# Patient Record
Sex: FEMALE | Race: BLACK OR AFRICAN AMERICAN | NOT HISPANIC OR LATINO | Employment: UNEMPLOYED | URBAN - METROPOLITAN AREA
[De-identification: names, ages, dates, MRNs, and addresses within clinical notes are randomized per-mention and may not be internally consistent; named-entity substitution may affect disease eponyms.]

---
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
## 2022-07-25 NOTE — NURSING NOTE
"Chief Complaint   Patient presents with     RECHECK     Follow up from Nexplanon Insertion on 5/9       Initial /67 (BP Location: Right arm, Patient Position: Chair, Cuff Size: Adult Regular)  Pulse 77  Wt 51 kg (112 lb 8 oz)  LMP  Estimated body mass index is 20.49 kg/(m^2) as calculated from the following:    Height as of 5/9/17: 1.575 m (5' 2\").    Weight as of 5/9/17: 50.8 kg (112 lb).  Medication Reconciliation: complete     Shruthi Mcmahon, CMA  May 23, 2017      " Chart Note
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
## 2023-09-20 ENCOUNTER — APPOINTMENT (OUTPATIENT)
Dept: URGENT CARE | Facility: URGENT CARE | Age: 23
End: 2023-09-20

## 2025-06-07 ENCOUNTER — OFFICE VISIT (OUTPATIENT)
Dept: URGENT CARE | Facility: URGENT CARE | Age: 25
End: 2025-06-07

## 2025-06-07 VITALS
BODY MASS INDEX: 23 KG/M2 | TEMPERATURE: 98.1 F | HEART RATE: 98 BPM | HEIGHT: 62 IN | RESPIRATION RATE: 16 BRPM | WEIGHT: 125 LBS | DIASTOLIC BLOOD PRESSURE: 73 MMHG | OXYGEN SATURATION: 98 % | SYSTOLIC BLOOD PRESSURE: 118 MMHG

## 2025-06-07 DIAGNOSIS — Z51.89 ENCOUNTER FOR POST-TRAUMATIC WOUND CHECK: ICD-10-CM

## 2025-06-07 DIAGNOSIS — S81.811D LACERATION OF RIGHT LOWER EXTREMITY, SUBSEQUENT ENCOUNTER: Primary | ICD-10-CM

## 2025-06-07 PROCEDURE — 99204 OFFICE O/P NEW MOD 45 MIN: CPT | Performed by: NURSE PRACTITIONER

## 2025-06-07 ASSESSMENT — PAIN SCALES - GENERAL: PAINLEVEL_OUTOF10: MILD PAIN (2)

## 2025-06-07 NOTE — PROGRESS NOTES
Assessment & Plan   1. Laceration of right lower extremity, subsequent encounter (Primary)      2. Encounter for post-traumatic wound check    See note below related to laceration examination of right lower extremity.  We discussed home care plan as noted through emergency room review/hospital discharge summary.    All areas seem to be healing well no indication for acute bacterial infection do not recommend antibiotic at this time.  Recommend continue cleansing area twice daily with soap and water applying bacitracin nonstick bandage and paper tape other easily removable tape.  Continue to monitor areas closely.  Recommend patient review her discharge summary plan she states she has at home.  If unable to find referral to trauma specialty that was noted on review of hospital encounter note we discussed calling for this referral information so patient can set up an appointment.    We discussed red flag symptoms that would warrant further evaluation.  Patient may continue Tylenol or ibuprofen as needed for discomfort.  Discussed recommendations for ambulation would recommend patient start to ambulate at home without use of walker if able to help increase circulation and decrease swelling of right lower extremity discussed ankle pumps for right lower extremity to help with circulation as well has elevation to heart level or higher throughout the day.  Letter was given for work.      45 minutes spent on the date of the encounter doing chart review, review of outside records, review of test results, patient visit, and documentation       KAIT Hyde [facility] URGENT CARE GUERRERONILSON Bates is a 24 year old female who presents to clinic today for the following health issues:  Chief Complaint   Patient presents with    Wound Check     Stitches placed on 6/2 at [facility] ED; patient has 28 stitches throughout right leg from falling through glass. Concerns for infection due  to increase in swelling and numbness.     Patient Request for Note/Letter     Patient requesting note for work as she was not told how long she needs to be off of work for due to injury.          6/7/2025     3:17 PM   Additional Questions   Roomed by MEHUL Kapoor   Accompanied by Sivakumar mother         6/7/2025   Forms   Any forms needing to be completed Yes         6/7/2025     3:17 PM   Patient Reported Additional Medications   Patient reports taking the following new medications Oxycodone PRN, ibuprofen, Robaxin, birth control, Ventolin     HPI    Patient presents to clinic she was recently discharged from [facility] in [facility] after traumatic injury to right lower extremity review of emergency encounter note through care everywhere indicates that patient stated she kicked a glass window with her right leg patient was noted to have BAL of 2.4 and was admitted for observation.  She was discharged with multiple sutures from lacerations right lower extremity with follow-up plan of care to include washing area twice daily with soap and water applying dressing for 7 days then could leave area open to air and follow-up with trauma specialty for suture removal and evaluation in 2 weeks.    Patient is with her mother who has been helping to care for patient's wounds.  Mom states that they have been following instructions as noted above are concerned of some areas of erythema.    Patient is also complaining of some numbness in the medial right ankle with right ankle swelling does improve with elevation.    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  States she has not been able to fully bear weight without use of a walker.  Review of ER notes states return to full activity without restrictions.      Review of Systems  Constitutional, HEENT, cardiovascular, pulmonary, gi and gu systems are negative, except as otherwise  "noted.      Objective    /73 (BP Location: Left arm, Patient Position: Sitting, Cuff Size: Adult Regular)   Pulse 98   Temp 98.1  F (36.7  C) (Oral)   Resp 16   Ht 1.575 m (5' 2\")   Wt 56.7 kg (125 lb)   SpO2 98%   BMI 22.86 kg/m    Physical Exam   GENERAL: alert and no distress  NECK: no adenopathy, no asymmetry, masses, or scars  RESP: lungs clear to auscultation - no rales, rhonchi or wheezes  CV: regular rate and rhythm, normal S1 S2, no S3 or S4, no murmur, click or rub, no peripheral edema  MS: Right lower extremity ankle has full range of motion CMS is intact with normal pulses tenderness areas of injury.  Swelling diffusely around ankle and dorsal foot.  SKIN: Multiple lacerations right lower extremity dressings were removed for evaluation mid medial upper thigh and posterior upper thigh sutures intact mild erythema negative for fluctuance warmth or drainage, right lower extremity multiple lacerations medial calf and ankle sutures intact mild erythema negative for fluctuance warmth or drainage similar area right lower lateral mid calf region.  All areas dry intact negative for fluctuance warmth or drainage.  Bacitracin was reapplied with appropriate dressings patient monitored evaluation well.          "

## 2025-06-07 NOTE — PROGRESS NOTES
Urgent Care Clinic Visit    Chief Complaint   Patient presents with    Wound Check     Stitches placed on 6/2 at [facility] ED; patient has 28 stitches throughout right leg from falling through glass. Concerns for infection due to increase in swelling and numbness.     Patient Request for Note/Letter     Patient requesting note for work as she was not told how long she needs to be off of work for due to injury.                6/7/2025     3:17 PM   Additional Questions   Roomed by MEHUL Kapoor   Accompanied by mother Shaver         6/7/2025   Forms   Any forms needing to be completed Yes         6/7/2025     3:17 PM   Patient Reported Additional Medications   Patient reports taking the following new medications Oxycodone PRN, ibuprofen, Robaxin, birth control, Ventolin     Antwon Fitzgerald LPN

## 2025-06-07 NOTE — Clinical Note
June 7, 2025      Jana Penaloza  [address] [address]        To Whom It May Concern:    Cinthiapetr BILLY Tremaine  was seen on ***.  Please excuse her  until *** due to {WORK EXCUSE:839433}.        Sincerely,        KAIT Hyde CNP    Electronically signed

## 2025-06-26 ENCOUNTER — OFFICE VISIT (OUTPATIENT)
Dept: URGENT CARE | Facility: URGENT CARE | Age: 25
End: 2025-06-26

## 2025-06-26 VITALS
OXYGEN SATURATION: 99 % | HEART RATE: 85 BPM | SYSTOLIC BLOOD PRESSURE: 108 MMHG | WEIGHT: 130 LBS | DIASTOLIC BLOOD PRESSURE: 75 MMHG | BODY MASS INDEX: 23.78 KG/M2 | RESPIRATION RATE: 18 BRPM | TEMPERATURE: 98.7 F

## 2025-06-26 DIAGNOSIS — Z51.89 ENCOUNTER FOR WOUND RE-CHECK: ICD-10-CM

## 2025-06-26 DIAGNOSIS — Z48.02 VISIT FOR SUTURE REMOVAL: ICD-10-CM

## 2025-06-26 DIAGNOSIS — S99.911A ANKLE INJURY, RIGHT, INITIAL ENCOUNTER: Primary | ICD-10-CM

## 2025-06-26 NOTE — PROGRESS NOTES
Urgent Care Clinic Visit    Chief Complaint   Patient presents with    Wound Check     Pt would like wound checked out, also a work note.                6/26/2025     4:35 PM   Additional Questions   Roomed by layne rogers   Accompanied by dashawn Mitchell         6/26/2025   Forms   Any forms needing to be completed Yes         6/26/2025     4:35 PM   Patient Reported Additional Medications   Patient reports taking the following new medications XuLan birth control patch

## 2025-06-26 NOTE — PROGRESS NOTES
URGENT CARE  Assessment & Plan   Assessment:   Jana Penaloza is a 25 year old female who's clinical presentation today is consistent with:   1. Ankle injury, right,   2. Encounter for wound re-check  3. Visit for suture removal  - Orthopedic  Referral; Future  - REMOVAL OF SUTURES  Plan:  Patient presents today endorsing a complicated history where she was seen 3 weeks ago for a traumatic accident which resulted in a penetrating injury to her right lower extremity after she kicked a window and there was glass embedded in her right leg, patient was admitted to the hospital over night for alcohol intox as we all as for complex sutures and glass foreign body removal, it appears patient was discharged on 6/4/2025 and told to return in 2 weeks for suture removal, patient was subsequently seen 3 days later on 6/7 for wound re-check, where she was told that wounds were healing fine but sutures were left in place  Patient presents today endorsing she is here for a follow-up, patient tells me she did have her sutures removed however it appears that there are 4 sutures still in place, lots of crusting and granulation so I am uncertain how many sutures are technically left, I was able to remove 4 visible external sutures today from right lower leg. 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  Discussed with her: any further return to work notes along with limitations and light duty will need to be evaluated by a PCP or Ortho specialty given the chronicity of injury and I recommend she follow-up with them, referral placed for ortho evaluation of limitations, in the interim we will give  her 1 more week off of work.  Lacerations appear to be healing well, and do not appear to be infected, patient currently has been covering them with gauze and tape as well as is wearing a walking boot, unsure as to the extent of her muscle damage, likely calf weakness from wearing boot for 3 weeks; recommend she continue to wear the boot until she is seen by Ortho for further evaluation and treatment.  No alarm signs or symptoms present     30 minutes spent by me (on the date of the encounter) doing chart review, history, physical exam, documentation, diagnostic testing, education/counseling and further activities per the note, not including procedures     KAIT Canales [facility]      ______________________________________________________________________      Subjective     HPI: Jana Penaloza  is a 25 year old  female who presents today for evaluation the following concerns:   Jana reports experiencing muscle damage following an incident where she fell through glass on Steph 3, 2025, 3 weeks ago. This accident resulted in multiple lacerations, for which she underwent surgery to remove the glass. She had stitches placed to close the wounds. Jana mentions that some sutures were removed, but she was informed that some might remain close to the surface and could come out on their own or dissolve. She expresses confusion about this explanation. She has not been able to see an orthopedic specialist or a pcp due to insurance issues/self pay, as they require a $250 upfront payment.    Jana needs a doctor's note for her job, as she works with children and is currently unable to run, which is a requirement for her role. Her employer is inquiring about her return date. She has already arranged short-term disability with her job but requires an extended doctor's note, until this is in place. She received a note from [facility], but it only covers her until tomorrow. She  is facing challenges in obtaining further documentation due to the limitations of the urgent care setting.    Review of Systems:  Pertinent review of systems as reflected in HPI, otherwise negative.     Objective    Physical Exam:  Vitals:    06/26/25 1635   BP: 108/75   BP Location: Left arm   Patient Position: Sitting   Cuff Size: Adult Regular   Pulse: 85   Resp: 18   Temp: 98.7  F (37.1  C)   TempSrc: Oral   SpO2: 99%   Weight: 59 kg (130 lb)      General:  alert and oriented, no acute distress, non ill-appearing   Vital signs reviewed: afebrile and normotensive     SKIN:   Healing lacerations to RLE,  No signs of cellulitic infection               ______________________________________________________________________    I explained my diagnostic considerations and recommendations to the patient  All questions were answered.   Please see AVS for any patient instructions & handouts given.

## 2025-06-26 NOTE — LETTER
June 26, 2025      Jana Penaloza  [address] [address]        To Whom It May Concern:    Jana Penaloza was seen in our clinic. She may return to work on 7/3/25      Sincerely,      KAIT Canales CNP

## 2025-07-01 ENCOUNTER — DOCUMENTATION ONLY (OUTPATIENT)
Dept: OTHER | Facility: CLINIC | Age: 25
End: 2025-07-01